# Patient Record
Sex: MALE | Race: WHITE | Employment: FULL TIME | URBAN - METROPOLITAN AREA
[De-identification: names, ages, dates, MRNs, and addresses within clinical notes are randomized per-mention and may not be internally consistent; named-entity substitution may affect disease eponyms.]

---

## 2019-04-06 ENCOUNTER — HOSPITAL ENCOUNTER (EMERGENCY)
Facility: HOSPITAL | Age: 29
Discharge: HOME | End: 2019-04-06
Attending: EMERGENCY MEDICINE
Payer: COMMERCIAL

## 2019-04-06 ENCOUNTER — APPOINTMENT (OUTPATIENT)
Dept: CT IMAGING | Facility: HOSPITAL | Age: 29
End: 2019-04-06
Attending: EMERGENCY MEDICINE
Payer: COMMERCIAL

## 2019-04-06 DIAGNOSIS — S09.90XA CLOSED HEAD INJURY, INITIAL ENCOUNTER: ICD-10-CM

## 2019-04-06 DIAGNOSIS — R55 VASOVAGAL SYNCOPE: Primary | ICD-10-CM

## 2019-04-06 PROCEDURE — 99284 EMERGENCY DEPT VISIT MOD MDM: CPT | Performed by: EMERGENCY MEDICINE

## 2019-04-06 PROCEDURE — 70450 CT HEAD/BRAIN W/O DYE: CPT | Performed by: EMERGENCY MEDICINE

## 2019-04-16 NOTE — ED PROVIDER NOTES
Patient Seen in: [facility] Emergency Department    History   No chief complaint on file. Stated Complaint: CONCUSSION    HPI    Please see paper chart for full report. No past medical history on file. No past surgical history on file.

## 2020-12-11 ENCOUNTER — HOSPITAL ENCOUNTER (EMERGENCY)
Facility: HOSPITAL | Age: 30
Discharge: HOME | End: 2020-12-11
Attending: EMERGENCY MEDICINE
Payer: COMMERCIAL

## 2020-12-11 VITALS
TEMPERATURE: 99 F | RESPIRATION RATE: 18 BRPM | HEART RATE: 70 BPM | OXYGEN SATURATION: 100 % | DIASTOLIC BLOOD PRESSURE: 73 MMHG | SYSTOLIC BLOOD PRESSURE: 132 MMHG | BODY MASS INDEX: 24 KG/M2 | WEIGHT: 165 LBS

## 2020-12-11 DIAGNOSIS — N35.911 STRICTURE OF URETHRAL MEATUS IN MALE, UNSPECIFIED STRICTURE TYPE: Primary | ICD-10-CM

## 2020-12-11 PROCEDURE — 81003 URINALYSIS AUTO W/O SCOPE: CPT | Performed by: EMERGENCY MEDICINE

## 2020-12-11 PROCEDURE — 99283 EMERGENCY DEPT VISIT LOW MDM: CPT

## 2020-12-11 NOTE — ED PROVIDER NOTES
Patient Seen in: [facility] Emergency Department      History   Patient presents with:  Urinary Symptoms    Stated Complaint: pt states he woke up this morning and had pain with urination.     HPI  This is a 79-year-old male who arrives here with com extremely small with only a couple millimeters some what seems to be some scar tissue right in the area. He has surgery on the posterior aspect of the penis itself. But there is no obvious abnormality seen there is no redness or cellulitis.   Extremities: days            Medications Prescribed:  There are no discharge medications for this patient.

## 2020-12-11 NOTE — ED INITIAL ASSESSMENT (HPI)
Dysuria with burning and change in urine stream over last 1.5-2 weeks.  Born with hypospadias, repaired as infant

## 2021-08-28 ENCOUNTER — HOSPITAL ENCOUNTER (OUTPATIENT)
Age: 31
Discharge: HOME | End: 2021-08-28
Payer: COMMERCIAL

## 2021-08-28 VITALS
WEIGHT: 165 LBS | RESPIRATION RATE: 20 BRPM | HEART RATE: 87 BPM | HEIGHT: 69 IN | TEMPERATURE: 98 F | DIASTOLIC BLOOD PRESSURE: 79 MMHG | BODY MASS INDEX: 24.44 KG/M2 | SYSTOLIC BLOOD PRESSURE: 136 MMHG | OXYGEN SATURATION: 98 %

## 2021-08-28 DIAGNOSIS — R21 RASH: Primary | ICD-10-CM

## 2021-08-28 PROCEDURE — 99213 OFFICE O/P EST LOW 20 MIN: CPT

## 2021-08-28 RX ORDER — TRIAMCINOLONE ACETONIDE 5 MG/G
1 OINTMENT TOPICAL 2 TIMES DAILY
Qty: 15 G | Refills: 1 | Status: SHIPPED | OUTPATIENT
Start: 2021-08-28 | End: 2021-09-04

## 2021-08-28 RX ORDER — PREDNISONE 20 MG/1
40 TABLET ORAL DAILY
Qty: 10 TABLET | Refills: 0 | Status: SHIPPED | OUTPATIENT
Start: 2021-08-28 | End: 2021-09-02

## 2021-08-28 NOTE — ED PROVIDER NOTES
Patient Seen in: [facility]      History   Patient presents with:  Rash Skin Problem    Stated Complaint: rashes/skin problem    HPI/Subjective:   HPI    30-year-old male presents to the immediate care for rash on his right lower leg and above.    Physical Exam     ED Triage Vitals [08/28/21 1541]   /79   Pulse 87   Resp 20   Temp 98.4 °F (36.9 °C)   Temp src Temporal   SpO2 98 %   O2 Device None (Room air)       Current:/79   Pulse 87   Temp 98.4 °F (36.9 °C) (Temporal)   Resp type rash, patient denies being in the vegetation. Patient states been very itchy, he has been scratching it, it has caused change in the rash. Patient also has rash on his right forearm I am able to see.   Initially gave consideration to scabies in the f

## 2021-08-28 NOTE — ED INITIAL ASSESSMENT (HPI)
C/o itchy rashes to right lower leg started last Sunday both lower legs right > left and some to right arm. Last week did golfing and stayed at the hotel.

## 2022-02-13 ENCOUNTER — APPOINTMENT (OUTPATIENT)
Dept: GENERAL RADIOLOGY | Facility: HOSPITAL | Age: 32
End: 2022-02-13
Payer: COMMERCIAL

## 2022-02-13 ENCOUNTER — HOSPITAL ENCOUNTER (EMERGENCY)
Facility: HOSPITAL | Age: 32
Discharge: HOME | End: 2022-02-13
Payer: COMMERCIAL

## 2022-02-13 VITALS
TEMPERATURE: 98 F | WEIGHT: 165.38 LBS | HEART RATE: 76 BPM | SYSTOLIC BLOOD PRESSURE: 124 MMHG | OXYGEN SATURATION: 98 % | DIASTOLIC BLOOD PRESSURE: 60 MMHG | BODY MASS INDEX: 24 KG/M2 | RESPIRATION RATE: 16 BRPM

## 2022-02-13 DIAGNOSIS — S49.92XA INJURY OF LEFT SHOULDER, INITIAL ENCOUNTER: Primary | ICD-10-CM

## 2022-02-13 PROCEDURE — 73030 X-RAY EXAM OF SHOULDER: CPT

## 2022-02-13 PROCEDURE — 99283 EMERGENCY DEPT VISIT LOW MDM: CPT

## 2022-02-13 RX ORDER — NAPROXEN 500 MG/1
500 TABLET ORAL 2 TIMES DAILY WITH MEALS
Qty: 40 TABLET | Refills: 0 | Status: SHIPPED | OUTPATIENT
Start: 2022-02-13 | End: 2022-03-05

## 2023-01-03 ENCOUNTER — HOSPITAL ENCOUNTER (OUTPATIENT)
Age: 33
Discharge: HOME | End: 2023-01-03
Attending: EMERGENCY MEDICINE
Payer: COMMERCIAL

## 2023-01-03 ENCOUNTER — APPOINTMENT (OUTPATIENT)
Dept: GENERAL RADIOLOGY | Age: 33
End: 2023-01-03
Attending: EMERGENCY MEDICINE
Payer: COMMERCIAL

## 2023-01-03 VITALS
HEART RATE: 85 BPM | HEIGHT: 70 IN | WEIGHT: 170 LBS | BODY MASS INDEX: 24.34 KG/M2 | OXYGEN SATURATION: 99 % | DIASTOLIC BLOOD PRESSURE: 62 MMHG | TEMPERATURE: 98 F | SYSTOLIC BLOOD PRESSURE: 124 MMHG | RESPIRATION RATE: 16 BRPM

## 2023-01-03 DIAGNOSIS — S62.339A CLOSED BOXER'S FRACTURE, INITIAL ENCOUNTER: Primary | ICD-10-CM

## 2023-01-03 PROCEDURE — 99213 OFFICE O/P EST LOW 20 MIN: CPT

## 2023-01-03 PROCEDURE — 29125 APPL SHORT ARM SPLINT STATIC: CPT

## 2023-01-03 PROCEDURE — 73130 X-RAY EXAM OF HAND: CPT | Performed by: EMERGENCY MEDICINE

## 2023-01-04 NOTE — DISCHARGE INSTRUCTIONS
Keep hand in splint for comfort  Take motrin as needed for pain every 4-6 hours  Follow up with orthopedic surgery

## 2023-04-26 ENCOUNTER — OFFICE VISIT (OUTPATIENT)
Dept: FAMILY MEDICINE CLINIC | Facility: CLINIC | Age: 33
End: 2023-04-26
Payer: COMMERCIAL

## 2023-04-26 VITALS
HEIGHT: 69 IN | RESPIRATION RATE: 16 BRPM | DIASTOLIC BLOOD PRESSURE: 60 MMHG | OXYGEN SATURATION: 98 % | HEART RATE: 83 BPM | BODY MASS INDEX: 25.33 KG/M2 | SYSTOLIC BLOOD PRESSURE: 108 MMHG | WEIGHT: 171 LBS | TEMPERATURE: 98 F

## 2023-04-26 DIAGNOSIS — J06.9 UPPER RESPIRATORY TRACT INFECTION, UNSPECIFIED TYPE: Primary | ICD-10-CM

## 2023-04-26 PROCEDURE — 3078F DIAST BP <80 MM HG: CPT | Performed by: NURSE PRACTITIONER

## 2023-04-26 PROCEDURE — 99202 OFFICE O/P NEW SF 15 MIN: CPT | Performed by: NURSE PRACTITIONER

## 2023-04-26 PROCEDURE — 3008F BODY MASS INDEX DOCD: CPT | Performed by: NURSE PRACTITIONER

## 2023-04-26 PROCEDURE — 3074F SYST BP LT 130 MM HG: CPT | Performed by: NURSE PRACTITIONER

## 2024-03-09 ENCOUNTER — OFFICE VISIT (OUTPATIENT)
Dept: FAMILY MEDICINE CLINIC | Facility: CLINIC | Age: 34
End: 2024-03-09
Payer: COMMERCIAL

## 2024-03-09 VITALS
OXYGEN SATURATION: 97 % | BODY MASS INDEX: 27.16 KG/M2 | HEART RATE: 88 BPM | SYSTOLIC BLOOD PRESSURE: 110 MMHG | WEIGHT: 183.38 LBS | TEMPERATURE: 100 F | DIASTOLIC BLOOD PRESSURE: 60 MMHG | RESPIRATION RATE: 16 BRPM | HEIGHT: 69 IN

## 2024-03-09 DIAGNOSIS — H92.03 OTALGIA, BILATERAL: ICD-10-CM

## 2024-03-09 DIAGNOSIS — R09.81 SINUS CONGESTION: ICD-10-CM

## 2024-03-09 DIAGNOSIS — H66.002 NON-RECURRENT ACUTE SUPPURATIVE OTITIS MEDIA OF LEFT EAR WITHOUT SPONTANEOUS RUPTURE OF TYMPANIC MEMBRANE: Primary | ICD-10-CM

## 2024-03-09 PROCEDURE — 99213 OFFICE O/P EST LOW 20 MIN: CPT | Performed by: NURSE PRACTITIONER

## 2024-03-09 PROCEDURE — 3078F DIAST BP <80 MM HG: CPT | Performed by: NURSE PRACTITIONER

## 2024-03-09 PROCEDURE — 3008F BODY MASS INDEX DOCD: CPT | Performed by: NURSE PRACTITIONER

## 2024-03-09 PROCEDURE — 3074F SYST BP LT 130 MM HG: CPT | Performed by: NURSE PRACTITIONER

## 2024-03-09 RX ORDER — AMOXICILLIN 875 MG/1
875 TABLET, COATED ORAL 2 TIMES DAILY
Qty: 20 TABLET | Refills: 0 | Status: SHIPPED | OUTPATIENT
Start: 2024-03-09 | End: 2024-03-19

## 2024-03-09 NOTE — PROGRESS NOTES
CHIEF COMPLAINT:     Chief Complaint   Patient presents with    Sinus Problem     Possible ear infection - Entered by patient  R/L ear pain.  S/s for 6-7 days.         HPI:   Yared Mo is a 33 year old male who presents to clinic today with complaints of bilateral ear pain, L>R ear pain. Reports Has had for 6 -7 days.  Reports has had congestion/sinus drainage for the past week.  Reorts past several weeks has had some degree of congestion as well. Pain is described as pressure, sharp pain at times and is located at inner left ear.  Patient denies history of ear infections.  Nothing makes ear pain worse, but none is palliative.  Home treatment includes otc meds, otc \"ear drops\" .  Pt reports decreased hearing. Pt denies hearing loss. Pt denies drainage. Patient denies use of Q-tips to clean the ears. Pt reports nasal congestion. Pt denies recent allergy symptoms. Pt reports  low grade fever.    No current outpatient medications on file.      No past medical history on file.   Social History:  Social History     Socioeconomic History    Marital status:    Tobacco Use    Smoking status: Every Day     Types: Cigarettes    Smokeless tobacco: Never   Vaping Use    Vaping Use: Every day   Substance and Sexual Activity    Alcohol use: Yes     Comment: occasional    Drug use: Yes     Types: Cannabis        REVIEW OF SYSTEMS:   GENERAL: Feeling well otherwise.  No chills.  No unexpected weight change.  SKIN: no unusual skin lesions or rashes  HEENT: See HPI  LUNGS: No cough, shortness of breath, or wheezing.  CARDIOVASCULAR: No chest pain, palpitations  GI: No N/V/C/D.  NEURO: denies headaches or dizziness    EXAM:   /60   Pulse 88   Temp 99.5 °F (37.5 °C)   Resp 16   Ht 5' 9\" (1.753 m)   Wt 183 lb 6.4 oz (83.2 kg)   SpO2 97%   BMI 27.08 kg/m²   GENERAL: well developed, well nourished,in no apparent distress  SKIN: no rashes,no suspicious lesions  HEAD: atraumatic, normocephalic  EYES: conjunctiva  clear, EOM intact  EARS: Tragus non tender on palpation bilaterally. External auditory canals healthy. Right TM: dull, no bulging, no retraction,yellowish fluid, bony landmarks partially visualized.  Left TM: dull, erythematous, injected, +retraction,opaque  fluid, bony landmarks not visualized.  NOSE: nostrils patent, clear exudates, nasal mucosa pink and noninflamed  THROAT: oral mucosa pink, moist. Posterior pharynx is not erythematous or injected. No exudates.  NECK: supple, non-tender  LUNGS: clear to auscultation bilaterally, no wheezes or rhonchi. No crackles/rales, good air movement throughout. Breathing is non labored.  CARDIO: RRR without murmur  LYMPH: No cervical lymphadenopathy.      ASSESSMENT AND PLAN:   Yared Mo is a 33 year old male who presents with:    ASSESSMENT:  Encounter Diagnoses   Name Primary?    Non-recurrent acute suppurative otitis media of left ear without spontaneous rupture of tympanic membrane Yes    Sinus congestion     Otalgia, bilateral        PLAN: Meds as listed below. comfort measures as described in Patient Instructions.      Meds & Refills for this Visit:  Requested Prescriptions      No prescriptions requested or ordered in this encounter         Risk and benefits of medication discussed. Stressed importance of completing full course of antibiotic.     Tylenol/Motrin prn pain.      Call or return if s/sx worsen, do not improve in 3 days, or if fever of 100.4 or greater persists for 72 hours.    There are no Patient Instructions on file for this visit.      Patient voiced understand and is in agreement with treatment plan.